# Patient Record
Sex: FEMALE | Race: WHITE | NOT HISPANIC OR LATINO | Employment: FULL TIME | URBAN - METROPOLITAN AREA
[De-identification: names, ages, dates, MRNs, and addresses within clinical notes are randomized per-mention and may not be internally consistent; named-entity substitution may affect disease eponyms.]

---

## 2020-01-17 ENCOUNTER — OFFICE VISIT (OUTPATIENT)
Dept: FAMILY MEDICINE CLINIC | Facility: CLINIC | Age: 35
End: 2020-01-17

## 2020-01-17 VITALS
WEIGHT: 135 LBS | RESPIRATION RATE: 16 BRPM | SYSTOLIC BLOOD PRESSURE: 132 MMHG | BODY MASS INDEX: 21.19 KG/M2 | DIASTOLIC BLOOD PRESSURE: 84 MMHG | HEART RATE: 74 BPM | HEIGHT: 67 IN | TEMPERATURE: 98 F

## 2020-01-17 DIAGNOSIS — F51.01 PRIMARY INSOMNIA: ICD-10-CM

## 2020-01-17 DIAGNOSIS — R53.82 CHRONIC FATIGUE: ICD-10-CM

## 2020-01-17 DIAGNOSIS — F41.1 GENERALIZED ANXIETY DISORDER: Primary | ICD-10-CM

## 2020-01-17 DIAGNOSIS — H81.93 DISORDER OF VESTIBULAR FUNCTION OF BOTH EARS: ICD-10-CM

## 2020-01-17 DIAGNOSIS — Z83.49 FAMILY HISTORY OF THYROID DISEASE IN MOTHER: ICD-10-CM

## 2020-01-17 PROCEDURE — 99202 OFFICE O/P NEW SF 15 MIN: CPT | Performed by: FAMILY MEDICINE

## 2020-01-17 RX ORDER — DULOXETIN HYDROCHLORIDE 30 MG/1
30 CAPSULE, DELAYED RELEASE ORAL DAILY
Qty: 7 CAPSULE | Refills: 0 | Status: SHIPPED | OUTPATIENT
Start: 2020-01-17

## 2020-01-17 RX ORDER — DULOXETIN HYDROCHLORIDE 60 MG/1
CAPSULE, DELAYED RELEASE ORAL
COMMUNITY
Start: 2019-12-22

## 2020-01-17 NOTE — PROGRESS NOTES
Subjective   Yisel Quigley is a 34 y.o. female.     History of Present Illness     Work sent her home for work from exhaustion  Was told to rest and be off work    She used DNA analyses, dr. Rachel leiva, [employer], who does podcasts and pt thinks is very helpful  She had some work up done by them in regards to DNA testing of some sort    She is on cymbalta from Dr. Tompkins, he is her psychiatrist.  Had been on zoloft in the past but this was back in high school  She is also using some THC on regular basis  She continues to deal with anxiety and sleep issues  Pt would like to get off the cymbalta  Long history of psychiatric care  She does use THC for her mood      MDDS, Mal de D[place]barquement, Debarkation Syndrome  [employer] [facility]  She has seen [facility] for this in the past but they were not able to diagnose it, her PT (radha haley) was able to tell her this is what she has  This is a vestibular problem    Pt thinks she has night eating syndrome but notes this is not true any more    She feels like she takes good care of herself  She is taking supplements that help her      She wants to be immunized as she has an 8 month old niece that she is around      The following portions of the patient's history were reviewed and updated as appropriate: allergies, current medications, past family history, past medical history, past social history, past surgical history and problem list.    Review of Systems   Constitutional: Negative.    HENT: Negative.    Eyes: Negative.    Respiratory: Negative.  Negative for shortness of breath.    Cardiovascular: Negative.  Negative for chest pain.   Gastrointestinal: Negative.  Negative for constipation and diarrhea.   Musculoskeletal: Negative.    Skin: Negative.    Neurological: Negative.    Psychiatric/Behavioral: The patient is nervous/anxious.    All other systems reviewed and are negative.      Objective   Physical Exam   Constitutional: She is oriented to person, place, and  "time. She appears well-developed and well-nourished. No distress.   HENT:   Head: Normocephalic and atraumatic.   Right Ear: Tympanic membrane, external ear and ear canal normal.   Left Ear: Tympanic membrane, external ear and ear canal normal.   Nose: Nose normal.   Mouth/Throat: Uvula is midline and oropharynx is clear and moist.   Eyes: Conjunctivae and EOM are normal.   Neck: Normal range of motion. Neck supple. No thyromegaly present.   Cardiovascular: Normal rate, regular rhythm and normal heart sounds.   No murmur heard.  Pulmonary/Chest: Effort normal and breath sounds normal. No respiratory distress.   Abdominal: Soft. Bowel sounds are normal. She exhibits no distension and no mass. There is no tenderness.   Lymphadenopathy:     She has no cervical adenopathy.   Neurological: She is alert and oriented to person, place, and time.   Skin: Skin is warm and dry.   Psychiatric: She has a normal mood and affect. Her behavior is normal. Judgment and thought content normal.   Jumpy, on edge, jumps from topic to topic and has pressured speech   Nursing note and vitals reviewed.      Assessment/Plan   Yisel was seen today for establish care.    Diagnoses and all orders for this visit:    Generalized anxiety disorder  -     CBC & Differential  -     Comprehensive Metabolic Panel  -     TSH  -     DULoxetine (CYMBALTA) 30 MG capsule; Take 1 capsule by mouth Daily.  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  She notes she is getting advice from a DNA test that was done online through [employer] \"[employer] expert of some sort.  Pt notes she knows what she has to do to help " her health and just wants some one who will listen to her.  I did listen to her discussion of her past history as well as diagnosis with vestibular issues that she sometimes still gets treated for though PT  She has a mood disorder of some type, has seen Dr. Tompkins, but wants to get off her cymbatla.  I wrote her 30 mg to take for a week and then she will stop this medicine.  I did encourage her to  Keep her psych appointments as this is her main health issue, in my opinion.    She tells me her work wants her to not work to est and get herself better.  It is unclear what this means if she was fired or something else transpired.  With her pressured speech and flight of ideas, I fell that working with her would be difficult.    She has some strong opinions on non medical means to help her health.  I do not feel I can offer advice concerning the DNA testing she had with leyla leiva (doctor of some sort, I am thinking possible PHD) but pt wants me to look at those    Will check labs for thyroid, Vit D, CBC, and CMP and f/u pending results.

## 2020-01-18 LAB
25(OH)D3+25(OH)D2 SERPL-MCNC: 40.8 NG/ML (ref 30–100)
ALBUMIN SERPL-MCNC: 5.1 G/DL (ref 3.5–5.2)
ALBUMIN/GLOB SERPL: 2.3 G/DL
ALP SERPL-CCNC: 63 U/L (ref 39–117)
ALT SERPL-CCNC: 16 U/L (ref 1–33)
AST SERPL-CCNC: 23 U/L (ref 1–32)
BASOPHILS # BLD AUTO: 0.07 10*3/MM3 (ref 0–0.2)
BASOPHILS NFR BLD AUTO: 1.2 % (ref 0–1.5)
BILIRUB SERPL-MCNC: 0.9 MG/DL (ref 0.2–1.2)
BUN SERPL-MCNC: 16 MG/DL (ref 6–20)
BUN/CREAT SERPL: 20 (ref 7–25)
CALCIUM SERPL-MCNC: 9.7 MG/DL (ref 8.6–10.5)
CHLORIDE SERPL-SCNC: 102 MMOL/L (ref 98–107)
CO2 SERPL-SCNC: 28.9 MMOL/L (ref 22–29)
CREAT SERPL-MCNC: 0.8 MG/DL (ref 0.57–1)
EOSINOPHIL # BLD AUTO: 0.04 10*3/MM3 (ref 0–0.4)
EOSINOPHIL NFR BLD AUTO: 0.7 % (ref 0.3–6.2)
ERYTHROCYTE [DISTWIDTH] IN BLOOD BY AUTOMATED COUNT: 12.8 % (ref 12.3–15.4)
GLOBULIN SER CALC-MCNC: 2.2 GM/DL
GLUCOSE SERPL-MCNC: 104 MG/DL (ref 65–99)
HCT VFR BLD AUTO: 39.6 % (ref 34–46.6)
HGB BLD-MCNC: 13 G/DL (ref 12–15.9)
IMM GRANULOCYTES # BLD AUTO: 0.02 10*3/MM3 (ref 0–0.05)
IMM GRANULOCYTES NFR BLD AUTO: 0.3 % (ref 0–0.5)
LYMPHOCYTES # BLD AUTO: 1.45 10*3/MM3 (ref 0.7–3.1)
LYMPHOCYTES NFR BLD AUTO: 24.7 % (ref 19.6–45.3)
MCH RBC QN AUTO: 28 PG (ref 26.6–33)
MCHC RBC AUTO-ENTMCNC: 32.8 G/DL (ref 31.5–35.7)
MCV RBC AUTO: 85.3 FL (ref 79–97)
MONOCYTES # BLD AUTO: 0.41 10*3/MM3 (ref 0.1–0.9)
MONOCYTES NFR BLD AUTO: 7 % (ref 5–12)
NEUTROPHILS # BLD AUTO: 3.89 10*3/MM3 (ref 1.7–7)
NEUTROPHILS NFR BLD AUTO: 66.1 % (ref 42.7–76)
NRBC BLD AUTO-RTO: 0 /100 WBC (ref 0–0.2)
PLATELET # BLD AUTO: 280 10*3/MM3 (ref 140–450)
POTASSIUM SERPL-SCNC: 4.3 MMOL/L (ref 3.5–5.2)
PROT SERPL-MCNC: 7.3 G/DL (ref 6–8.5)
RBC # BLD AUTO: 4.64 10*6/MM3 (ref 3.77–5.28)
SODIUM SERPL-SCNC: 142 MMOL/L (ref 136–145)
TSH SERPL DL<=0.005 MIU/L-ACNC: 1.44 UIU/ML (ref 0.27–4.2)
WBC # BLD AUTO: 5.88 10*3/MM3 (ref 3.4–10.8)

## 2020-02-04 ENCOUNTER — OFFICE VISIT (OUTPATIENT)
Dept: FAMILY MEDICINE CLINIC | Facility: CLINIC | Age: 35
End: 2020-02-04

## 2020-02-04 VITALS
HEIGHT: 67 IN | DIASTOLIC BLOOD PRESSURE: 84 MMHG | HEART RATE: 78 BPM | SYSTOLIC BLOOD PRESSURE: 118 MMHG | WEIGHT: 141 LBS | RESPIRATION RATE: 18 BRPM | BODY MASS INDEX: 22.13 KG/M2 | TEMPERATURE: 97.8 F

## 2020-02-04 DIAGNOSIS — F31.9 BIPOLAR 1 DISORDER (HCC): Primary | ICD-10-CM

## 2020-02-04 PROCEDURE — 99213 OFFICE O/P EST LOW 20 MIN: CPT | Performed by: FAMILY MEDICINE

## 2020-02-04 RX ORDER — RISPERIDONE 2 MG/1
TABLET ORAL
COMMUNITY
Start: 2020-01-21

## 2020-02-04 RX ORDER — LORAZEPAM 0.5 MG/1
TABLET ORAL
COMMUNITY
Start: 2020-02-03

## 2020-02-04 NOTE — PROGRESS NOTES
Subjective   Yisel Quigley is a 34 y.o. female.     History of Present Illness     She was recently admitted to  over the [facility] weekend and started on risperdal  Was there for 5 days  She was diagnosed with bipolar disorder, which is new to her  She is seeing Dr. Tompkins and will be seeing another psych (Dr. Maldonado) today for a second opinion    Dr. Tompkins may keep him off work but not sure about that process    She was not eating nor sleeping    She did not have any SI nor HI        Review of Systems   Constitutional: Negative.        Objective   Physical Exam   Constitutional: She is oriented to person, place, and time. She appears well-developed and well-nourished. No distress.   Cardiovascular: Normal rate, regular rhythm and normal heart sounds.   Pulmonary/Chest: Effort normal and breath sounds normal.   Neurological: She is alert and oriented to person, place, and time.   Psychiatric: She has a normal mood and affect. Her behavior is normal.   Nursing note and vitals reviewed.      Assessment/Plan   Yisel was seen today for hospital follow up.    Diagnoses and all orders for this visit:    Bipolar 1 disorder (CMS/HCC)    she will get a second opinion from another psych today.  I agree with this.  Will request notes from  as well as Dr. Tompkins and continue to monitor situation    She is on risperdol and Dr. Tompkins gave her ativan.  I did tell her that she will need a psych to help with her chronic medications and that thiw is a long term issue for her.    I am glad she got the help she needed and will continue to encourage psychiatric follow up.